# Patient Record
Sex: MALE | Race: WHITE | NOT HISPANIC OR LATINO | URBAN - METROPOLITAN AREA
[De-identification: names, ages, dates, MRNs, and addresses within clinical notes are randomized per-mention and may not be internally consistent; named-entity substitution may affect disease eponyms.]

---

## 2019-04-27 ENCOUNTER — EMERGENCY (EMERGENCY)
Facility: HOSPITAL | Age: 59
LOS: 1 days | Discharge: HOME | End: 2019-04-27
Attending: EMERGENCY MEDICINE | Admitting: EMERGENCY MEDICINE
Payer: SELF-PAY

## 2019-04-27 VITALS
TEMPERATURE: 98 F | SYSTOLIC BLOOD PRESSURE: 160 MMHG | HEART RATE: 63 BPM | RESPIRATION RATE: 17 BRPM | DIASTOLIC BLOOD PRESSURE: 79 MMHG | OXYGEN SATURATION: 98 %

## 2019-04-27 VITALS
HEART RATE: 74 BPM | OXYGEN SATURATION: 97 % | DIASTOLIC BLOOD PRESSURE: 93 MMHG | TEMPERATURE: 98 F | RESPIRATION RATE: 18 BRPM | SYSTOLIC BLOOD PRESSURE: 153 MMHG

## 2019-04-27 PROCEDURE — 99053 MED SERV 10PM-8AM 24 HR FAC: CPT

## 2019-04-27 PROCEDURE — 99284 EMERGENCY DEPT VISIT MOD MDM: CPT | Mod: 25

## 2019-04-27 NOTE — ED ADULT NURSE NOTE - NSIMPLEMENTINTERV_GEN_ALL_ED
Implemented All Fall with Harm Risk Interventions:  [place] to call system. Call bell, personal items and telephone within reach. Instruct patient to call for assistance. Room bathroom lighting operational. Non-slip footwear when patient is off stretcher. Physically safe environment: no spills, clutter or unnecessary equipment. Stretcher in lowest position, wheels locked, appropriate side rails in place. Provide visual cue, wrist band, yellow gown, etc. Monitor gait and stability. Monitor for mental status changes and reorient to person, place, and time. Review medications for side effects contributing to fall risk. Reinforce activity limits and safety measures with patient and family. Provide visual clues: red socks.

## 2019-04-27 NOTE — ED ADULT TRIAGE NOTE - AS O2 DELIVERY
----- Message from Roz Hyde sent at 1/22/2018  8:15 AM CST -----  Mom/Catarina Zak is calling concerning patient is still feeling awful. She was up all night. She thinks she has a sinus infection. Please call to advise at [phone].   room air

## 2019-04-27 NOTE — ED ADULT NURSE NOTE - OBJECTIVE STATEMENT
59 yo M BIBA for AMS. PT admits to ETOH use today. Pt denies fall and no sign of trauma or acute distress noted at this time. Pt safety maintained and will continue to  monitor.

## 2019-04-27 NOTE — ED PROVIDER NOTE - OBJECTIVE STATEMENT
Patient was BIBE for public EtOH intoxication. Unsteady gait, drowsy. No pain, no n/v and no trauma or incontinence. No drugs.

## 2019-04-27 NOTE — ED ADULT NURSE NOTE - CHIEF COMPLAINT QUOTE
BIBA for ams due to etoh. Patient was intoxicated in the street trying to get back to his hotel. Gait unsteady, no signs of injury at triage. Patient is easily rousable and calm/compliant with care.

## 2019-04-27 NOTE — ED PROVIDER NOTE - PHYSICAL EXAMINATION
Const: Severe EtOH intox, good hygiene  ENT: Airway patent, protecting airway. Nasal mucosa clear. MMM. No scalp hematoma and no apparent c-spine tenderness.  Eyes: Clear bilaterally, pupils equal, round 3mm  Cardiac: Normal rate, regular rhythm.  Heart sounds S1, S2.  No murmurs, rubs or gallops.  Resp: Breath sounds clear and equal bilaterally.  GI: Abdomen soft, appears non-tender, no guarding.  MSK: No signs of acute trauma or injury.   Neuro: Severe EtOH intox, THAPA, normal tone, slurred speech, answers all simple questions.  Skin: No signs of acute trauma or injury.   Psych, Severe EtOH intox, cooperative and polite

## 2019-05-01 DIAGNOSIS — F10.120 ALCOHOL ABUSE WITH INTOXICATION, UNCOMPLICATED: ICD-10-CM

## 2019-05-01 DIAGNOSIS — R41.82 ALTERED MENTAL STATUS, UNSPECIFIED: ICD-10-CM

## 2021-05-05 NOTE — ED ADULT NURSE NOTE - NSFALLRSKASSESSTYPE_ED_ALL_ED
Initial (On Arrival)

## 2022-08-22 NOTE — ED ADULT TRIAGE NOTE - NS ED NURSE DIRECT TO ROOM YN
IP Liaison - Initial Visit Note    Patient: Nenita Mcneal  MRN:  [id number]  Date of Service:  8/22/2022  Completed by:  KYA Hope    Reason for Visit   Patient presents with    IP Liaison Initial Visit       RSW met with patient at bedside in order to complete SDOH questionnaire and liaison assessment.  Pt has identified barriers to care of stress in relation to losing spouse. 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 Per pt, pt is not in need of resources at this time.    The following were addressed during this visit:  - Review SDOH Questions   - Complete patient assessment   - Complete initial visit with patient        Patient Summary     IP Liaison Patient Assessment    General  Level of Caregiver support: Member independent and does not need caregiver assistance  Have you had to make a decision between paying for any of the following in the last 2 months?: None  Transportation means: Self, Friend  Employment status: Retired but volunteering  Assessments  Was the PHQ Depression Screening completed this visit?: No  Was the MCKAYLA-7 Screening completed this visit?: No       KYA Hope  
Yes

## 2024-02-17 NOTE — ED ADULT TRIAGE NOTE - CHIEF COMPLAINT QUOTE
BIBA for ams due to etoh. Patient was intoxicated in the street trying to get back to his hotel. Gait unsteady, no signs of injury at triage. Patient is easily rousable and calm/compliant with care. mouth details…